# Patient Record
Sex: FEMALE | Race: BLACK OR AFRICAN AMERICAN | URBAN - METROPOLITAN AREA
[De-identification: names, ages, dates, MRNs, and addresses within clinical notes are randomized per-mention and may not be internally consistent; named-entity substitution may affect disease eponyms.]

---

## 2024-02-16 ENCOUNTER — OFFICE VISIT (OUTPATIENT)
Dept: OBGYN CLINIC | Age: 24
End: 2024-02-16

## 2024-02-16 VITALS
WEIGHT: 261 LBS | BODY MASS INDEX: 41.95 KG/M2 | SYSTOLIC BLOOD PRESSURE: 118 MMHG | DIASTOLIC BLOOD PRESSURE: 74 MMHG | HEIGHT: 66 IN

## 2024-02-16 DIAGNOSIS — Z01.419 WOMEN'S ANNUAL ROUTINE GYNECOLOGICAL EXAMINATION: ICD-10-CM

## 2024-02-16 DIAGNOSIS — N89.8 VAGINAL DISCHARGE: ICD-10-CM

## 2024-02-16 DIAGNOSIS — Z11.3 SCREEN FOR STD (SEXUALLY TRANSMITTED DISEASE): ICD-10-CM

## 2024-02-16 DIAGNOSIS — Z12.4 PAP SMEAR FOR CERVICAL CANCER SCREENING: Primary | ICD-10-CM

## 2024-02-16 DIAGNOSIS — Z12.4 PAP SMEAR FOR CERVICAL CANCER SCREENING: ICD-10-CM

## 2024-02-16 LAB
HBV SURFACE AG SER QL: NONREACTIVE
HCV AB SER QL: NONREACTIVE
HIV 1+2 AB+HIV1 P24 AG SERPL QL IA: NONREACTIVE
HIV 1/2 RESULT COMMENT: NORMAL

## 2024-02-16 NOTE — PROGRESS NOTES
Manjula Young is a 23 y.o.  who is here for AE        Patient's last menstrual period was 2024 (exact date).    Menses:Q month, lasting 4-5 days    Menstrual Complaints: none    Birth Control:none    Last Pap:2019    Hx abnormal pap or STD: hx CT    Hx of receiving HPV vaccination: received    Last Mammo: n/a    Fam Hx of Breast, ovarian or uterine cancer:denies    Sexually Active:yes            ROS:    Breast: Denies pain, lump or nipple discharge    GYN: Denies pelvic pain, discharge, itching, odor or dysuria.     Constitutional: Negative for chills and fever.     HENT: Negative for severe headaches or vision changes    Respiratory: Negative for cough and shortness of breath.      Cardiovascular: Negative for chest pain and palpitations.     Gastrointestinal: Negative for nausea and vomiting. Negative for diarrhea and constipation    Genitourinary: Negative for dysuria and hematuria.     Musculoskeletal: Negative for back pain    Skin: Negative for rash and wound.     Psych: No depression or anxiety          Past Medical History:   Diagnosis Date    Chlamydia     Kidney stones     as a baby    Pre-eclampsia        Past Surgical History:   Procedure Laterality Date     SECTION      KIDNEY STONE REMOVAL         History reviewed. No pertinent family history.    Social History     Socioeconomic History    Marital status: Single     Spouse name: Not on file    Number of children: Not on file    Years of education: Not on file    Highest education level: Not on file   Occupational History    Not on file   Tobacco Use    Smoking status: Never    Smokeless tobacco: Never   Substance and Sexual Activity    Alcohol use: Yes     Comment: occ    Drug use: Never    Sexual activity: Yes     Partners: Male     Birth control/protection: Condom   Other Topics Concern    Not on file   Social History Narrative    Abuse: Feels safe at home, no history of physical abuse, no history of sexual

## 2024-02-16 NOTE — ASSESSMENT & PLAN NOTE
Pap + std screening  Mammo n/a  HPV vaccine already received    D/W pt at length multiple options, risks, benefits, SE's about OCP's, Nuvaring, Patch, Nexplanon, Depo and IUD including but not limited to H/A, N/V, thromboembolic events, bleeding patterns, weight gain and efficacy.  Also, reminded pt that these methods do NOT protect against STD's - condoms are encouraged. Aches s/s reviewed    Patient denies h/o DVT, stroke, MI, migraines, liver disease or HTN  Pt desires mirena IUD. Order form completed. Declines bridge

## 2024-02-16 NOTE — PROGRESS NOTES
Chaperone for Intimate Exam     Chaperone was offer accepted as part of the rooming process    Chaperone: Ana Lilia GARCIA CMA

## 2024-02-16 NOTE — PROGRESS NOTES
I have reviewed the patient's visit today including history, exam and assessment by EDITH Santiago.  I agree with treatment/plan as above.    Maurice Gauthier MD  11:17 AM  02/16/24

## 2024-02-19 LAB — RPR SER QL: NONREACTIVE

## 2024-02-22 LAB
C TRACH RRNA CVX QL NAA+PROBE: NEGATIVE
COLLECTION METHOD: ABNORMAL
CYTOLOGIST CVX/VAG CYTO: ABNORMAL
CYTOLOGY CVX/VAG DOC THIN PREP: ABNORMAL
DATE OF LMP: ABNORMAL
HPV APTIMA: NEGATIVE
HPV GENOTYPE REFLEX: ABNORMAL
Lab: ABNORMAL
N GONORRHOEA RRNA CVX QL NAA+PROBE: NEGATIVE
PAP SOURCE: ABNORMAL
PATH REPORT.FINAL DX SPEC: ABNORMAL
PREV TREATMENT: ABNORMAL
STAT OF ADQ CVX/VAG CYTO-IMP: ABNORMAL
T VAGINALIS RRNA SPEC QL NAA+PROBE: POSITIVE

## 2024-02-23 RX ORDER — METRONIDAZOLE 500 MG/1
500 TABLET ORAL 2 TIMES DAILY
Qty: 14 TABLET | Refills: 0 | Status: SHIPPED | OUTPATIENT
Start: 2024-02-23 | End: 2024-03-01

## 2024-02-23 NOTE — TELEPHONE ENCOUNTER
Patient tested positive for Trichomonas. This is a sexually transmitted infection. This can be treated with the following, if not allergic    Flagyl 500mg PO BID for 7 days, #14, No Refills        Patient needs to notify partner so [facility] can [facility] and[facility]or [facility]. If patient is pregnant, we will retest during her pregnancy. If not pregnant, please schedule patient for retest in 3 months.    Recommend patient abstain from intercourse for 7 days following treatment. Condom use is recommended to help prevent the spread of stds and reinfection.

## 2024-03-17 PROBLEM — Z01.419 WOMEN'S ANNUAL ROUTINE GYNECOLOGICAL EXAMINATION: Status: RESOLVED | Noted: 2024-02-16 | Resolved: 2024-03-17

## 2024-03-20 ENCOUNTER — TELEPHONE (OUTPATIENT)
Dept: OBGYN CLINIC | Age: 24
End: 2024-03-20

## 2024-03-20 NOTE — TELEPHONE ENCOUNTER
Called to advise patient IUD is in office and she will need to call once her cycle begins to schedule an appt. Patient verbalized understanding and said she will call. SEUN

## 2024-06-19 NOTE — PROGRESS NOTES
PROGRESS NOTE    SUBJECTIVE:   Manjula Young is a 24 y.o. female seen for employment physical for annual employer Health Risk Assessment and biometric screening as required to obtain their insurance premium discounts.  No complaints or concerns at this time.     Chief Complaint    Employment Physical           OBJECTIVE:  /80 (Site: Left Upper Arm, Position: Sitting, Cuff Size: Large Adult)   Pulse 92   Ht 1.676 m (5' 6\")   Wt 115.5 kg (254 lb 9.6 oz)   BMI 41.09 kg/m²    Additional Measurements    06/20/24 0910   Waist (Inches): 49.5 in        Physical Exam  Vitals reviewed.   Constitutional:       General: She is awake. She is not in acute distress.     Appearance: Normal appearance. She is well-developed and well-groomed.      Comments: Successful venipuncture of the LAC performed, pt tolerated procedure well   Cardiovascular:      Rate and Rhythm: Normal rate and regular rhythm.      Heart sounds: Normal heart sounds.   Pulmonary:      Effort: Pulmonary effort is normal.   Neurological:      Mental Status: She is alert.   Psychiatric:         Behavior: Behavior is cooperative.         ASSESSMENT and PLAN    Manjula was seen today for employment physical.    Diagnoses and all orders for this visit:    Encounter for biometric screening  -     Glucose, Random  -     Lipid Panel W/ Chol/HDL Ratio  -     Hemoglobin A1C  -     COLLECTION VENOUS BLOOD,VENIPUNCTURE        Discussed HR notification process for credits and that I will contact with results when available    Counseled on benefits of having a primary care provider which includes, but is not limited to, continuity of care and having a medical home when concerns arise. Also enforced that onsite clinic policy states that we are not to take the place of a primary care provider, pt verbalized understanding.     I have reviewed the patient's medication list, past medical, family, social, and surgical history in detail and updated the patient

## 2024-06-20 ENCOUNTER — OFFICE VISIT (OUTPATIENT)
Age: 24
End: 2024-06-20

## 2024-06-20 VITALS
SYSTOLIC BLOOD PRESSURE: 124 MMHG | BODY MASS INDEX: 40.92 KG/M2 | HEIGHT: 66 IN | WEIGHT: 254.6 LBS | HEART RATE: 92 BPM | DIASTOLIC BLOOD PRESSURE: 80 MMHG

## 2024-06-20 DIAGNOSIS — Z00.8 ENCOUNTER FOR BIOMETRIC SCREENING: Primary | ICD-10-CM

## 2024-06-21 LAB
CHOLEST SERPL-MCNC: 201 MG/DL (ref 100–199)
CHOLEST/HDLC SERPL: 4.3 RATIO (ref 0–4.4)
GLUCOSE SERPL-MCNC: 75 MG/DL (ref 70–99)
HBA1C MFR BLD: 5.3 % (ref 4.8–5.6)
HDLC SERPL-MCNC: 47 MG/DL
LDLC SERPL CALC-MCNC: 138 MG/DL (ref 0–99)
TRIGL SERPL-MCNC: 86 MG/DL (ref 0–149)
VLDLC SERPL CALC-MCNC: 16 MG/DL (ref 5–40)

## 2024-08-21 ENCOUNTER — OFFICE VISIT (OUTPATIENT)
Age: 24
End: 2024-08-21

## 2024-08-21 VITALS
SYSTOLIC BLOOD PRESSURE: 118 MMHG | OXYGEN SATURATION: 99 % | DIASTOLIC BLOOD PRESSURE: 80 MMHG | HEART RATE: 88 BPM | TEMPERATURE: 98.9 F | RESPIRATION RATE: 16 BRPM

## 2024-08-21 DIAGNOSIS — B95.0 GROUP A STREPTOCOCCAL INFECTION: Primary | ICD-10-CM

## 2024-08-21 DIAGNOSIS — R50.9 FEVER, UNSPECIFIED FEVER CAUSE: ICD-10-CM

## 2024-08-21 DIAGNOSIS — J02.9 SORE THROAT: ICD-10-CM

## 2024-08-21 LAB
GROUP A STREP ANTIGEN, POC: POSITIVE
INFLUENZA A ANTIGEN, POC: NEGATIVE
INFLUENZA B ANTIGEN, POC: NEGATIVE
SARS-COV-2 RNA, POC: NEGATIVE
VALID INTERNAL CONTROL, POC: YES

## 2024-08-21 RX ORDER — AZITHROMYCIN 250 MG/1
TABLET, FILM COATED ORAL
Qty: 6 TABLET | Refills: 0 | Status: SHIPPED | OUTPATIENT
Start: 2024-08-21 | End: 2024-08-31

## 2024-08-21 ASSESSMENT — ENCOUNTER SYMPTOMS
CONSTIPATION: 0
NAUSEA: 1
COUGH: 0
SINUS PRESSURE: 0
VOMITING: 0
SINUS PAIN: 0
RHINORRHEA: 1
WHEEZING: 0
SHORTNESS OF BREATH: 0
CHEST TIGHTNESS: 0
DIARRHEA: 0
EYE ITCHING: 0
ABDOMINAL PAIN: 0
SORE THROAT: 1
EYE REDNESS: 0
EYE PAIN: 0
TROUBLE SWALLOWING: 0

## 2024-08-21 NOTE — PROGRESS NOTES
headaches. Negative for dizziness and light-headedness.   Psychiatric/Behavioral:  Negative for confusion.           OBJECTIVE:  /80 (Site: Left Upper Arm, Position: Sitting, Cuff Size: Large Adult)   Pulse 88   Temp 98.9 °F (37.2 °C) (Oral)   Resp 16   SpO2 99%      Results for orders placed or performed in visit on 08/21/24   AMB POC RAPID STREP A   Result Value Ref Range    Valid Internal Control, POC Yes     Group A Strep Antigen, POC Positive (A)    AMB POC SARS-COV-2 AND INFLUENZA A/B   Result Value Ref Range    SARS-COV-2 RNA, POC Negative     Influenza A Antigen, POC Negative     Influenza B Antigen, POC Negative        Physical Exam  Constitutional:       General: She is awake. She is not in acute distress.     Appearance: Normal appearance. She is well-developed and well-groomed. She is not ill-appearing, toxic-appearing or diaphoretic.   HENT:      Right Ear: Tympanic membrane, ear canal and external ear normal.      Left Ear: Tympanic membrane, ear canal and external ear normal.      Nose: Mucosal edema and congestion present.      Right Turbinates: Swollen.      Left Turbinates: Swollen.      Right Sinus: No maxillary sinus tenderness or frontal sinus tenderness.      Left Sinus: No maxillary sinus tenderness or frontal sinus tenderness.      Mouth/Throat:      Lips: Pink.      Mouth: Mucous membranes are moist.      Pharynx: Uvula midline. Pharyngeal swelling and posterior oropharyngeal erythema present. No oropharyngeal exudate, uvula swelling or postnasal drip.      Tonsils: No tonsillar exudate or tonsillar abscesses. 1+ on the right. 1+ on the left.   Eyes:      Conjunctiva/sclera: Conjunctivae normal.      Pupils: Pupils are equal.   Cardiovascular:      Rate and Rhythm: Normal rate and regular rhythm.      Heart sounds: Normal heart sounds, S1 normal and S2 normal.   Pulmonary:      Effort: Pulmonary effort is normal.      Breath sounds: Normal breath sounds.   Musculoskeletal:

## 2024-11-27 ENCOUNTER — OFFICE VISIT (OUTPATIENT)
Age: 24
End: 2024-11-27

## 2024-11-27 VITALS
DIASTOLIC BLOOD PRESSURE: 76 MMHG | HEIGHT: 67 IN | BODY MASS INDEX: 40.27 KG/M2 | WEIGHT: 256.6 LBS | SYSTOLIC BLOOD PRESSURE: 120 MMHG | HEART RATE: 66 BPM

## 2024-11-27 DIAGNOSIS — Z00.8 ENCOUNTER FOR BIOMETRIC SCREENING: Primary | ICD-10-CM

## 2024-11-27 NOTE — PROGRESS NOTES
PROGRESS NOTE    SUBJECTIVE:   Manjula Young is a 24 y.o. female seen in the employer based health center located at [facility] for employment physical for annual employer Health Risk Assessment and biometric screening as required to obtain their insurance premium discounts.  No complaints or concerns at this time.     Chief Complaint    Health Assessment           OBJECTIVE:  /76 (Site: Left Upper Arm, Position: Sitting, Cuff Size: Large Adult)   Pulse 66   Ht 1.689 m (5' 6.5\")   Wt 116.4 kg (256 lb 9.6 oz)   BMI 40.80 kg/m²    Additional Measurements    11/27/24 0909   Waist (Inches): 46.5 in        Physical Exam  Vitals reviewed.   Constitutional:       General: She is awake. She is not in acute distress.     Appearance: Normal appearance. She is well-developed and well-groomed.      Comments: Successful venipuncture of the LAC performed, pt tolerated procedure well   Cardiovascular:      Rate and Rhythm: Normal rate and regular rhythm.      Heart sounds: Normal heart sounds.   Pulmonary:      Effort: Pulmonary effort is normal.   Neurological:      Mental Status: She is alert.   Psychiatric:         Behavior: Behavior is cooperative.       ASSESSMENT and PLAN    Manjula was seen today for health assessment.    Diagnoses and all orders for this visit:    Encounter for biometric screening  -     Glucose, Random  -     Lipid Panel W/ Chol/HDL Ratio  -     Hemoglobin A1C  -     COLLECTION VENOUS BLOOD,VENIPUNCTURE        Discussed HR notification process for credits and that I will contact with results when available    Counseled on benefits of having a primary care provider which includes, but is not limited to, continuity of care and having a medical home when concerns arise. Also enforced that onsite clinic policy states that we are not to take the place of a primary care provider, pt verbalized understanding.     I have reviewed the patient's medication list, past medical,

## 2024-11-28 LAB
CHOLEST SERPL-MCNC: 198 MG/DL (ref 100–199)
CHOLEST/HDLC SERPL: 4.7 RATIO (ref 0–4.4)
GLUCOSE SERPL-MCNC: 85 MG/DL (ref 70–99)
HBA1C MFR BLD: 5.3 % (ref 4.8–5.6)
HDLC SERPL-MCNC: 42 MG/DL
LDLC SERPL CALC-MCNC: 132 MG/DL (ref 0–99)
TRIGL SERPL-MCNC: 132 MG/DL (ref 0–149)
VLDLC SERPL CALC-MCNC: 24 MG/DL (ref 5–40)

## 2025-01-30 ENCOUNTER — OFFICE VISIT (OUTPATIENT)
Age: 25
End: 2025-01-30

## 2025-01-30 VITALS
OXYGEN SATURATION: 99 % | SYSTOLIC BLOOD PRESSURE: 122 MMHG | TEMPERATURE: 99.7 F | RESPIRATION RATE: 18 BRPM | DIASTOLIC BLOOD PRESSURE: 82 MMHG | HEART RATE: 92 BPM

## 2025-01-30 DIAGNOSIS — J32.9 RHINOSINUSITIS: Primary | ICD-10-CM

## 2025-01-30 LAB
GROUP A STREP ANTIGEN, POC: NEGATIVE
INFLUENZA A ANTIGEN, POC: NEGATIVE
INFLUENZA B ANTIGEN, POC: NEGATIVE
LOT EXPIRE DATE: NORMAL
LOT KIT NUMBER: NORMAL
SARS-COV-2 RNA, POC: NEGATIVE
VALID INTERNAL CONTROL, POC: YES
VALID INTERNAL CONTROL: YES
VENDOR AND KIT NAME POC: NORMAL

## 2025-01-30 ASSESSMENT — ENCOUNTER SYMPTOMS
EYE PAIN: 0
RHINORRHEA: 1
ABDOMINAL PAIN: 0
DIARRHEA: 0
SWOLLEN GLANDS: 0
SHORTNESS OF BREATH: 0
SINUS PAIN: 0
COUGH: 1
SORE THROAT: 1
CHEST TIGHTNESS: 0
WHEEZING: 0
EYE REDNESS: 0
VOMITING: 0
EYE ITCHING: 0
NAUSEA: 1
EYE DISCHARGE: 0
SINUS PRESSURE: 1

## 2025-01-30 NOTE — PROGRESS NOTES
immediate evaluation including, but not limited to HA, blurred vision, speech disturbance, difficulty with ambulation/gait, numbness, tingling, weakness, syncope, chest pain, or shortness of breath.    I have reviewed the patient's medication list, past medical, family, social, and surgical history in detail and updated the patient record appropriately.    Wayne Ralph, APRN - CNP

## 2025-07-01 ENCOUNTER — OFFICE VISIT (OUTPATIENT)
Age: 25
End: 2025-07-01

## 2025-07-01 VITALS
DIASTOLIC BLOOD PRESSURE: 84 MMHG | RESPIRATION RATE: 16 BRPM | TEMPERATURE: 97.9 F | HEART RATE: 77 BPM | SYSTOLIC BLOOD PRESSURE: 120 MMHG | OXYGEN SATURATION: 98 %

## 2025-07-01 DIAGNOSIS — R11.0 NAUSEA: Primary | ICD-10-CM

## 2025-07-01 DIAGNOSIS — R19.7 DIARRHEA, UNSPECIFIED TYPE: ICD-10-CM

## 2025-07-01 RX ORDER — ONDANSETRON 4 MG/1
4 TABLET, ORALLY DISINTEGRATING ORAL 3 TIMES DAILY PRN
Qty: 30 TABLET | Refills: 0 | Status: SHIPPED | OUTPATIENT
Start: 2025-07-01

## 2025-07-01 ASSESSMENT — ENCOUNTER SYMPTOMS
DIARRHEA: 1
BACK PAIN: 0
FLATUS: 0
COUGH: 0
NAUSEA: 1
ABDOMINAL PAIN: 1
BLOATING: 0
VOMITING: 0
SHORTNESS OF BREATH: 0

## 2025-07-01 NOTE — PROGRESS NOTES
PROGRESS NOTE    SUBJECTIVE:   Manjula Young is a 25 y.o. female seen in the employer based health center located at [facility] for nausea, abdominal cramping, and diarrhea that started an hour after [email or web address] [place]      Chief Complaint    Nausea           History provided by:  Patient   used: No    Diarrhea   This is a new problem. The current episode started today. The problem occurs 2 to 4 times per day. The problem has been gradually worsening. The stool consistency is described as Watery. The patient states that diarrhea does not awaken her from sleep. Associated symptoms include abdominal pain and chills. Pertinent negatives include no arthralgias, bloating, coughing, fever, headaches, increased  flatus, myalgias, sweats, URI, vomiting or weight loss. Nothing aggravates the symptoms. She has tried nothing for the symptoms.         Current Outpatient Medications   Medication Sig Dispense Refill    ondansetron (ZOFRAN-ODT) 4 MG disintegrating tablet Take 1 tablet by mouth 3 times daily as needed for Nausea or Vomiting 30 tablet 0     No current facility-administered medications for this visit.      Allergies   Allergen Reactions    Nitrofurantoin Rash     Rash on right arm that blistered up 2 days after starting Macrobid.    Amoxicillin Rash       Social History     Tobacco Use    Smoking status: Never    Smokeless tobacco: Never   Substance Use Topics    Alcohol use: Yes     Comment: occ    Drug use: Never        Review of Systems   Constitutional:  Positive for chills. Negative for fatigue, fever and weight loss.   Respiratory:  Negative for cough and shortness of breath.    Cardiovascular:  Negative for chest pain.   Gastrointestinal:  Positive for abdominal pain, diarrhea and nausea. Negative for bloating, flatus and vomiting.   Genitourinary:  Negative for decreased urine volume, difficulty urinating and urgency.   Musculoskeletal:  Negative

## 2025-09-02 ENCOUNTER — OFFICE VISIT (OUTPATIENT)
Age: 25
End: 2025-09-02

## 2025-09-02 VITALS
DIASTOLIC BLOOD PRESSURE: 80 MMHG | TEMPERATURE: 98.2 F | HEART RATE: 97 BPM | RESPIRATION RATE: 16 BRPM | SYSTOLIC BLOOD PRESSURE: 124 MMHG | OXYGEN SATURATION: 99 %

## 2025-09-02 DIAGNOSIS — H57.89 IRRITATION OF LEFT EYE: Primary | ICD-10-CM

## 2025-09-02 ASSESSMENT — ENCOUNTER SYMPTOMS
DOUBLE VISION: 0
EYE ITCHING: 0
PHOTOPHOBIA: 0
EYE PAIN: 1
EYE DISCHARGE: 1
VOMITING: 0
FOREIGN BODY SENSATION: 0
BLURRED VISION: 1
NAUSEA: 0